# Patient Record
Sex: MALE | Race: WHITE | Employment: OTHER | URBAN - METROPOLITAN AREA
[De-identification: names, ages, dates, MRNs, and addresses within clinical notes are randomized per-mention and may not be internally consistent; named-entity substitution may affect disease eponyms.]

---

## 2017-07-26 ENCOUNTER — HOSPITAL ENCOUNTER (OUTPATIENT)
Dept: GENERAL RADIOLOGY | Age: 62
Discharge: HOME | End: 2017-07-26
Attending: FAMILY MEDICINE
Payer: COMMERCIAL

## 2017-07-26 DIAGNOSIS — M79.672 LEFT FOOT PAIN: ICD-10-CM

## 2017-07-26 PROCEDURE — 73630 X-RAY EXAM OF FOOT: CPT

## 2020-02-13 ENCOUNTER — HOSPITAL ENCOUNTER (OUTPATIENT)
Dept: VASCULAR SURGERY | Age: 65
Discharge: HOME | End: 2020-02-13
Attending: FAMILY MEDICINE
Payer: COMMERCIAL

## 2020-02-13 DIAGNOSIS — I65.23 BILATERAL CAROTID ARTERY OCCLUSION: ICD-10-CM

## 2020-02-13 PROCEDURE — 93880 EXTRACRANIAL BILAT STUDY: CPT

## 2020-02-14 LAB
LEFT CCA DIST DIAS: 22.8 CM/S
LEFT CCA DIST SYS: 78.4 CM/S
LEFT CCA PROX DIAS: 21.5 CM/S
LEFT CCA PROX SYS: 99.1 CM/S
LEFT ECA DIAS: 18.89 CM/S
LEFT ECA SYS: 106.9 CM/S
LEFT ICA DIST DIAS: 20.7 CM/S
LEFT ICA DIST SYS: 45.9 CM/S
LEFT ICA MID DIAS: 20.6 CM/S
LEFT ICA MID SYS: 45.9 CM/S
LEFT ICA PROX DIAS: 12.7 CM/S
LEFT ICA PROX SYS: 45 CM/S
LEFT ICA/CCA SYS: 0.59
LEFT SUBCLAVIAN DIAS: 0 CM/S
LEFT SUBCLAVIAN SYS: 95.6 CM/S
LEFT VERTEBRAL DIAS: 18.93 CM/S
LEFT VERTEBRAL SYS: 53.1 CM/S
RIGHT CCA DIST DIAS: 15 CM/S
RIGHT CCA DIST SYS: 78.4 CM/S
RIGHT CCA PROX DIAS: 15 CM/S
RIGHT CCA PROX SYS: 83.6 CM/S
RIGHT ECA DIAS: 11.13 CM/S
RIGHT ECA SYS: 97.8 CM/S
RIGHT ICA DIST DIAS: 37 CM/S
RIGHT ICA DIST SYS: 92.7 CM/S
RIGHT ICA MID DIAS: 22.8 CM/S
RIGHT ICA MID SYS: 83.6 CM/S
RIGHT ICA PROX DIAS: 16.3 CM/S
RIGHT ICA PROX SYS: 51.3 CM/S
RIGHT ICA/CCA SYS: 1.2
RIGHT SUBCLAVIAN DIAS: 0 CM/S
RIGHT SUBCLAVIAN SYS: 120.6 CM/S
RIGHT VERTEBRAL DIAS: 12.53 CM/S
RIGHT VERTEBRAL SYS: 51 CM/S

## 2022-01-21 ENCOUNTER — TRANSCRIBE ORDER (OUTPATIENT)
Dept: SCHEDULING | Age: 67
End: 2022-01-21

## 2022-01-21 DIAGNOSIS — Z13.6 ENCOUNTER FOR SCREENING FOR CARDIOVASCULAR DISORDERS: Primary | ICD-10-CM

## 2022-02-03 ENCOUNTER — HOSPITAL ENCOUNTER (OUTPATIENT)
Dept: VASCULAR SURGERY | Age: 67
Discharge: HOME | End: 2022-02-03
Attending: FAMILY MEDICINE
Payer: MEDICARE

## 2022-02-03 ENCOUNTER — HOSPITAL ENCOUNTER (OUTPATIENT)
Dept: ULTRASOUND IMAGING | Age: 67
Discharge: HOME | End: 2022-02-03
Attending: FAMILY MEDICINE
Payer: MEDICARE

## 2022-02-03 DIAGNOSIS — Z13.6 ENCOUNTER FOR SCREENING FOR CARDIOVASCULAR DISORDERS: ICD-10-CM

## 2022-02-03 PROCEDURE — 93880 EXTRACRANIAL BILAT STUDY: CPT

## 2022-02-03 PROCEDURE — 76706 US ABDL AORTA SCREEN AAA: CPT

## 2022-02-04 LAB
LEFT CCA DIST DIAS: 20.2 CM/S
LEFT CCA DIST SYS: 83.6 CM/S
LEFT CCA PROX DIAS: 21.5 CM/S
LEFT CCA PROX SYS: 127.9 CM/S
LEFT ECA DIAS: 0 CM/S
LEFT ECA SYS: 91.4 CM/S
LEFT ICA DIST DIAS: 25 CM/S
LEFT ICA DIST SYS: 72.1 CM/S
LEFT ICA MID DIAS: 25.8 CM/S
LEFT ICA MID SYS: 62.5 CM/S
LEFT ICA PROX DIAS: 10.2 CM/S
LEFT ICA PROX SYS: 46.9 CM/S
LEFT ICA/CCA SYS: 0.86
LEFT VERTEBRAL DIAS: 17.1 CM/S
LEFT VERTEBRAL SYS: 52 CM/S
RIGHT CCA DIST DIAS: 16.3 CM/S
RIGHT CCA DIST SYS: 83.6 CM/S
RIGHT CCA PROX DIAS: 20.2 CM/S
RIGHT CCA PROX SYS: 110.8 CM/S
RIGHT ECA DIAS: 13.89 CM/S
RIGHT ECA SYS: 104.3 CM/S
RIGHT ICA DIST DIAS: 25.4 CM/S
RIGHT ICA DIST SYS: 91.4 CM/S
RIGHT ICA MID DIAS: 28 CM/S
RIGHT ICA MID SYS: 86.2 CM/S
RIGHT ICA PROX DIAS: 16.2 CM/S
RIGHT ICA PROX SYS: 71.2 CM/S
RIGHT ICA/CCA SYS: 1.1
RIGHT VERTEBRAL DIAS: 11.82 CM/S
RIGHT VERTEBRAL SYS: 46 CM/S
VAS LEFT SUBCLAVIAN PROX EDV: 11.6 CM/S
VAS LEFT SUBCLAVIAN PROX PSV: 147.7 CM/S
VAS RIGHT SUBCLAVIAN PROX EDV: 0 CM/S
VAS RIGHT SUBCLAVIAN PROX PSV: 209.8 CM/S

## 2022-06-02 ENCOUNTER — HOSPITAL ENCOUNTER (OUTPATIENT)
Dept: GENERAL RADIOLOGY | Age: 67
Discharge: HOME | End: 2022-06-02
Payer: MEDICARE

## 2022-06-02 ENCOUNTER — TRANSCRIBE ORDER (OUTPATIENT)
Dept: EMERGENCY DEPT | Age: 67
End: 2022-06-02

## 2022-06-02 DIAGNOSIS — M54.50 LOW BACK PAIN: ICD-10-CM

## 2022-06-02 DIAGNOSIS — M54.50 LOW BACK PAIN: Primary | ICD-10-CM

## 2022-06-02 PROCEDURE — 72100 X-RAY EXAM L-S SPINE 2/3 VWS: CPT

## 2022-08-22 ENCOUNTER — OFFICE VISIT (OUTPATIENT)
Dept: ORTHOPEDIC SURGERY | Age: 67
End: 2022-08-22
Payer: MEDICARE

## 2022-08-22 VITALS — BODY MASS INDEX: 23.7 KG/M2 | WEIGHT: 160 LBS | HEIGHT: 69 IN

## 2022-08-22 DIAGNOSIS — G89.29 OTHER CHRONIC BACK PAIN: Primary | ICD-10-CM

## 2022-08-22 DIAGNOSIS — M54.9 OTHER CHRONIC BACK PAIN: Primary | ICD-10-CM

## 2022-08-22 PROCEDURE — 3017F COLORECTAL CA SCREEN DOC REV: CPT | Performed by: ORTHOPAEDIC SURGERY

## 2022-08-22 PROCEDURE — G8536 NO DOC ELDER MAL SCRN: HCPCS | Performed by: ORTHOPAEDIC SURGERY

## 2022-08-22 PROCEDURE — G8420 CALC BMI NORM PARAMETERS: HCPCS | Performed by: ORTHOPAEDIC SURGERY

## 2022-08-22 PROCEDURE — 1123F ACP DISCUSS/DSCN MKR DOCD: CPT | Performed by: ORTHOPAEDIC SURGERY

## 2022-08-22 PROCEDURE — 99204 OFFICE O/P NEW MOD 45 MIN: CPT | Performed by: ORTHOPAEDIC SURGERY

## 2022-08-22 PROCEDURE — G8427 DOCREV CUR MEDS BY ELIG CLIN: HCPCS | Performed by: ORTHOPAEDIC SURGERY

## 2022-08-22 PROCEDURE — G8432 DEP SCR NOT DOC, RNG: HCPCS | Performed by: ORTHOPAEDIC SURGERY

## 2022-08-22 PROCEDURE — 1101F PT FALLS ASSESS-DOCD LE1/YR: CPT | Performed by: ORTHOPAEDIC SURGERY

## 2022-08-22 RX ORDER — CHORIONIC GONADOTROPIN 10000 UNIT
KIT INTRAMUSCULAR
COMMUNITY

## 2022-08-22 RX ORDER — METHYLPHENIDATE HYDROCHLORIDE 27 MG/1
27 TABLET ORAL DAILY
COMMUNITY
Start: 2022-07-27

## 2022-08-22 NOTE — PROGRESS NOTES
Javier Triana (: 1955) is a 73 y.o. male patient, here for evaluation of the following chief complaint(s):  Back Pain       ASSESSMENT/PLAN:  Below is the assessment and plan developed based on review of pertinent history, physical exam, labs, studies, and medications. Lower back pain 3 months of physical therapy some improvement a lot of mechanical discomfort no radiculopathy I like to move forward with an MRI I suspect he has facet osteoarthritis with neuroforamen impingement and he may benefit from a localized injection based on MRI findings on the lumbar spine  Mri reviewed. Symptoms and lesion concerniing for chordoma. Discussed with pt. Will get mri with contrast.  May get second opiinion. 1. Other chronic back pain  -     XR SPINE ENTIRE T-L , SKULL TO SACRUM 1 VW SCOLIOSIS; Future  -     XR SPINE LUMBAR BEND/FLEXION EXTENSION; Future  -     MRI LUMB SPINE WO CONT; Future      No follow-ups on file. SUBJECTIVE/OBJECTIVE:  Javier Triana (: 1955) is a 73 y.o. male who presents today for the following:  Chief Complaint   Patient presents with    Back Pain       Lower back pain for a good 9 months he has tried home exercises hot yoga as well as recently 3 months of formal physical therapy tries to stay active no trauma no falls no loss of bowel or bladder function no true radiculopathy most of his discomfort is mechanical    IMAGING:  AP and lateral view of his lumbar spine he has osteoarthritis at 4 5 S1 I do not appreciate spondylolisthesis with flexion-extension views he has some degenerative changes at L5-S1 at the disc and L4-5 at the disc he has some mild lumbar asymmetry pedicles are well visualized    No Known Allergies    Current Outpatient Medications   Medication Sig    chorionic gonadotropin, human 10,000 unit solr chorionic gonadotropin, human 10,000 unit intramuscular solution   Inject 10 units every day by intramuscular route.     methylphenidate ER 27 mg 24 hr tab Take 27 mg by mouth daily. rosuvastatin (CRESTOR) 20 mg tablet Take 20 mg by mouth nightly. DULoxetine (CYMBALTA) 60 mg capsule Take 60 mg by mouth daily. buPROPion SR (WELLBUTRIN SR) 150 mg SR tablet Take  by mouth two (2) times a day. lansoprazole (PREVACID) 15 mg capsule Take 15 mg by mouth Daily (before breakfast). (Patient not taking: Reported on 8/22/2022)     No current facility-administered medications for this visit. History reviewed. No pertinent past medical history. Past Surgical History:   Procedure Laterality Date    UT CARDIAC SURG PROCEDURE UNLIST  heart cath       Family History   Problem Relation Age of Onset    Cancer Mother     Stroke Father         Social History     Tobacco Use    Smoking status: Former     Types: Cigarettes    Smokeless tobacco: Never   Substance Use Topics    Alcohol use: No        Review of Systems     No flowsheet data found. Vitals:  Ht 5' 9\" (1.753 m)   Wt 160 lb (72.6 kg)   BMI 23.63 kg/m²    Body mass index is 23.63 kg/m². Physical Exam    Pleasant young man well-groomed can walk on his heels walk on his toes moderate discomfort with flexion extension of the lumbar spine he does have a stork sign the patient can walk on heels and toes. Negative Romberg. Negative drift. Extraocular motility is intact. No pain with axial compression of the shoulder or head. No pain to palpation, spinous processes, cervical or thoracic or lumbar spine. No pain with flexion or extension of the lumbar spine. Hamstrings are not tight. No dimples. No hairy patches. No pelvic obliquity. No limb length discrepancy. No clonus. Negative straight leg raise, no prominence on Reynoso forward bending test.  +2 reflexes throughout. 5/5 muscle strength. Painless internal and external rotation of the hips. Abdomen is soft, nontender. No masses are appreciated. No kyphosis present. Sensation is intact to light touch.       An electronic signature was used to authenticate this note.   -- Mary Manning MD

## 2022-08-31 DIAGNOSIS — G89.29 OTHER CHRONIC BACK PAIN: Primary | ICD-10-CM

## 2022-08-31 DIAGNOSIS — M54.9 OTHER CHRONIC BACK PAIN: Primary | ICD-10-CM

## 2022-09-01 DIAGNOSIS — G89.29 OTHER CHRONIC BACK PAIN: Primary | ICD-10-CM

## 2022-09-01 DIAGNOSIS — M54.9 OTHER CHRONIC BACK PAIN: Primary | ICD-10-CM

## 2022-09-14 ENCOUNTER — HOSPITAL ENCOUNTER (OUTPATIENT)
Dept: MRI IMAGING | Age: 67
Discharge: HOME | End: 2022-09-14
Attending: ORTHOPAEDIC SURGERY
Payer: MEDICARE

## 2022-09-14 VITALS — BODY MASS INDEX: 24.29 KG/M2 | WEIGHT: 164 LBS | HEIGHT: 69 IN

## 2022-09-14 DIAGNOSIS — G89.29 OTHER CHRONIC BACK PAIN: ICD-10-CM

## 2022-09-14 DIAGNOSIS — M54.9 OTHER CHRONIC BACK PAIN: ICD-10-CM

## 2022-09-14 PROCEDURE — A9576 INJ PROHANCE MULTIPACK: HCPCS | Performed by: RADIOLOGY

## 2022-09-14 PROCEDURE — 72197 MRI PELVIS W/O & W/DYE: CPT

## 2022-09-14 PROCEDURE — 74011250636 HC RX REV CODE- 250/636: Performed by: RADIOLOGY

## 2022-09-14 RX ADMIN — GADOTERIDOL 14 ML: 279.3 INJECTION, SOLUTION INTRAVENOUS at 08:24

## 2022-10-27 ENCOUNTER — TELEPHONE (OUTPATIENT)
Dept: ORTHOPEDIC SURGERY | Age: 67
End: 2022-10-27

## 2022-10-27 DIAGNOSIS — M51.9 LUMBAR DISC DISEASE: ICD-10-CM

## 2022-10-27 NOTE — TELEPHONE ENCOUNTER
Orders for SAVAGE  L4-L5 sent  to [address] [place] [address]      ----- Message from Candy Sanchez MD sent at 9/30/2022  6:33 AM EDT -----  Regarding: FW: Injection  Epidural steroid injection. Does not matter which level. thanks  ----- Message -----  From: Faith Vernon LPN  Sent: 7/83/8044   6:03 PM EDT  To: Candy Sanchez MD  Subject: Injection                                        Patient called requesting injection? ?? Let me know what level.   TY

## 2022-11-16 ENCOUNTER — HOSPITAL ENCOUNTER (OUTPATIENT)
Dept: INTERVENTIONAL RADIOLOGY/VASCULAR | Age: 67
Discharge: HOME | End: 2022-11-16
Attending: ORTHOPAEDIC SURGERY | Admitting: RADIOLOGY
Payer: MEDICARE

## 2022-11-16 VITALS — WEIGHT: 167.8 LBS | HEIGHT: 69 IN | BODY MASS INDEX: 24.85 KG/M2

## 2022-11-16 DIAGNOSIS — M51.9 LUMBAR DISC DISEASE: ICD-10-CM

## 2022-11-16 PROCEDURE — 62323 NJX INTERLAMINAR LMBR/SAC: CPT

## 2022-11-16 PROCEDURE — 74011000250 HC RX REV CODE- 250: Performed by: RADIOLOGY

## 2022-11-16 PROCEDURE — 74011250636 HC RX REV CODE- 250/636: Performed by: RADIOLOGY

## 2022-11-16 PROCEDURE — 74011000636 HC RX REV CODE- 636: Performed by: RADIOLOGY

## 2022-11-16 PROCEDURE — 64483 NJX AA&/STRD TFRM EPI L/S 1: CPT

## 2022-11-16 RX ORDER — BUPIVACAINE HYDROCHLORIDE 2.5 MG/ML
6 INJECTION, SOLUTION EPIDURAL; INFILTRATION; INTRACAUDAL ONCE
Status: COMPLETED | OUTPATIENT
Start: 2022-11-16 | End: 2022-11-16

## 2022-11-16 RX ORDER — LIDOCAINE HYDROCHLORIDE 10 MG/ML
20 INJECTION, SOLUTION EPIDURAL; INFILTRATION; INTRACAUDAL; PERINEURAL ONCE
Status: DISCONTINUED | OUTPATIENT
Start: 2022-11-16 | End: 2022-11-16 | Stop reason: HOSPADM

## 2022-11-16 RX ORDER — DEXAMETHASONE SODIUM PHOSPHATE 10 MG/ML
10 INJECTION INTRAMUSCULAR; INTRAVENOUS ONCE
Status: COMPLETED | OUTPATIENT
Start: 2022-11-16 | End: 2022-11-16

## 2022-11-16 RX ORDER — LIDOCAINE HYDROCHLORIDE 10 MG/ML
6 INJECTION, SOLUTION EPIDURAL; INFILTRATION; INTRACAUDAL; PERINEURAL ONCE
Status: COMPLETED | OUTPATIENT
Start: 2022-11-16 | End: 2022-11-16

## 2022-11-16 RX ADMIN — BUPIVACAINE HYDROCHLORIDE 15 MG: 2.5 INJECTION, SOLUTION EPIDURAL; INFILTRATION; INTRACAUDAL; PERINEURAL at 10:30

## 2022-11-16 RX ADMIN — LIDOCAINE HYDROCHLORIDE 6 ML: 10 INJECTION, SOLUTION EPIDURAL; INFILTRATION; INTRACAUDAL; PERINEURAL at 11:00

## 2022-11-16 RX ADMIN — IOPAMIDOL 3 ML: 408 INJECTION, SOLUTION INTRATHECAL at 10:32

## 2022-11-16 RX ADMIN — DEXAMETHASONE SODIUM PHOSPHATE 10 MG: 10 INJECTION, SOLUTION INTRAMUSCULAR; INTRAVENOUS at 10:32

## 2022-11-16 NOTE — DISCHARGE INSTRUCTIONS
Lumbar Epidural Steroid Injection: What to Expect at [address] [place] [address]  During your lumbar epidural injection, your doctor injected steroid medicine into the area around your spinal cord to help with pain, tingling, or numbness. Steroids don't always work. And when they do, it takes a few days. But the pain relief can last for several days to a few months or longer. Your injection may also have included a numbing medicine that works right away for a short time. It may leave your legs feeling heavy or numb at first. You will probably be able to walk. But you may need to be extra careful. The effects of the numbing medicine should wear off in a few hours. This care sheet gives you a general idea about how long it will take for you to recover. But each person recovers at a different pace. Follow the steps below to feel better as quickly as possible. How can you care for yourself at home? Activity    You may want to do less than normal for a few days. But you may also be able to return to your daily routine. You may shower if your doctor okays it. Do not take a bath for the first 24 hours, or until your doctor tells you it is okay. Diet    You can eat your normal diet. Medicines    Your doctor will tell you if and when you can restart your medicines. You will also get instructions about taking any new medicines. If you stopped taking aspirin or some other blood thinner, your doctor will tell you when to start taking it again. Be safe with medicines. Read and follow all instructions on the label. If the doctor gave you a prescription medicine for pain, take it as prescribed. If you are not taking a prescription pain medicine, ask your doctor if you can take an over-the-counter medicine. Ice    If the site of your injection feels sore or tender, put ice or a cold pack on it for 10 to 20 minutes at a time. Put a thin cloth between the ice and your skin.    Follow-up care is a key part of your treatment and safety. Be sure to make and go to all appointments, and call your doctor if you are having problems. It's also a good idea to know your test results and keep a list of the medicines you take. When should you call for help? Call 911 anytime you think you may need emergency care. For example, call if:    You passed out (lost consciousness). You have trouble breathing. Call your doctor now or seek immediate medical care if:    You have new pain, or your pain gets worse. You have new numbness in your buttocks or legs. You have new weakness in your buttocks or legs. You have a severe headache. You have new loss of bowel or bladder control. You have signs of infection, such as: Increased pain, swelling, warmth, or redness. A fever. Watch closely for any changes in your health, and be sure to contact your doctor if:    You do not get better as expected. Current as of: February 23, 2022               Content Version: 13.4  © 2006-2022 [employer]. Care instructions adapted under license by [employer] (which disclaims liability or warranty for this information). If you have questions about a medical condition or this instruction, always ask your healthcare professional. [place] [address] any warranty or liability for your use of this information.

## 2022-11-16 NOTE — ROUTINE PROCESS
9:04 AM  Patient arrived. ID and allergies verified verbally with patient. Pt voices understanding of procedure to be performed. Consent obtained. Pt prepped for procedure. 10:10 AM  TRANSFER - OUT REPORT:    Verbal report given to Elissa RN(name) on [facility]  being transferred to IR(unit) for ordered procedure       Report consisted of patients Situation, Background, Assessment and   Recommendations(SBAR). Information from the following report(s) SBAR was reviewed with the receiving nurse. Lines:       Opportunity for questions and clarification was provided. Patient transported with:   Registered Nurse    10:45 AM  TRANSFER - IN REPORT:    Verbal report received from Ed RN(name) on [facility]  being received from IR(unit) for routine post - op      Report consisted of patients Situation, Background, Assessment and   Recommendations(SBAR). Information from the following report(s) SBAR and Procedure Summary was reviewed with the receiving nurse. Opportunity for questions and clarification was provided. Assessment completed upon patients arrival to unit and care assumed. 10:50 AM  Patient laying in stretcher, band aids on back clean dry and intact. 10:55 AM  Patient sitting on side of bed, reports no feeling of numbness. Discharge instructions reviewed with patient and family. Voiced understanding. Patient given copy of discharge instructions to take home. 11:00 AM  Patient stands at bedside, reports no feeling of numbness, ambulates with no issues. 11:10 AM  Pt discharged via wheelchair with wife. Personal belongings with patient upon discharge.

## 2022-11-16 NOTE — PROGRESS NOTES
TRANSFER - IN REPORT:    Verbal report received from Suzette(name) on [facility]  [facility]      Report consisted of patients Situation, Background, Assessment and   Recommendations(SBAR). Information from the following report(s) SBAR was reviewed with the receiving nurse. Opportunity for questions and clarification was provided. Assessment completed upon patients arrival to unit and care assumed.

## 2023-01-12 ENCOUNTER — TELEPHONE (OUTPATIENT)
Dept: ORTHOPEDIC SURGERY | Age: 68
End: 2023-01-12

## 2023-01-12 DIAGNOSIS — M51.9 LUMBAR DISC DISEASE: ICD-10-CM

## 2023-01-12 DIAGNOSIS — G89.29 OTHER CHRONIC BACK PAIN: Primary | ICD-10-CM

## 2023-01-12 DIAGNOSIS — M54.9 OTHER CHRONIC BACK PAIN: Primary | ICD-10-CM

## 2023-01-19 ENCOUNTER — HOSPITAL ENCOUNTER (OUTPATIENT)
Dept: INTERVENTIONAL RADIOLOGY/VASCULAR | Age: 68
Discharge: HOME | End: 2023-01-19
Attending: ORTHOPAEDIC SURGERY | Admitting: STUDENT IN AN ORGANIZED HEALTH CARE EDUCATION/TRAINING PROGRAM

## 2023-01-19 VITALS — WEIGHT: 178.9 LBS | HEIGHT: 69 IN | BODY MASS INDEX: 26.5 KG/M2

## 2023-01-19 DIAGNOSIS — M51.9 LUMBAR DISC DISEASE: ICD-10-CM

## 2023-01-19 DIAGNOSIS — G89.29 OTHER CHRONIC BACK PAIN: ICD-10-CM

## 2023-01-19 DIAGNOSIS — M54.9 OTHER CHRONIC BACK PAIN: ICD-10-CM

## 2023-01-19 RX ORDER — DEXAMETHASONE SODIUM PHOSPHATE 10 MG/ML
10 INJECTION INTRAMUSCULAR; INTRAVENOUS ONCE
Status: DISCONTINUED | OUTPATIENT
Start: 2023-01-19 | End: 2023-01-19 | Stop reason: HOSPADM

## 2023-01-19 RX ORDER — LIDOCAINE HYDROCHLORIDE 10 MG/ML
8 INJECTION, SOLUTION EPIDURAL; INFILTRATION; INTRACAUDAL; PERINEURAL
Status: DISCONTINUED | OUTPATIENT
Start: 2023-01-19 | End: 2023-01-19 | Stop reason: HOSPADM

## 2023-01-19 NOTE — ROUTINE PROCESS
7:47 AM    Patient arrived. ID and allergies verified verbally with patient. Pt voices understanding of procedure to be performed. Consent obtained. Pt prepped for procedure. Pt denies contrast allergy. Patient denies taking any blood thinners.

## 2023-01-20 ENCOUNTER — HOSPITAL ENCOUNTER (OUTPATIENT)
Dept: INTERVENTIONAL RADIOLOGY/VASCULAR | Age: 68
Discharge: HOME | End: 2023-01-20
Attending: ORTHOPAEDIC SURGERY | Admitting: STUDENT IN AN ORGANIZED HEALTH CARE EDUCATION/TRAINING PROGRAM
Payer: MEDICARE

## 2023-01-20 VITALS — WEIGHT: 177.4 LBS | BODY MASS INDEX: 26.28 KG/M2 | HEIGHT: 69 IN

## 2023-01-20 PROCEDURE — 74011000250 HC RX REV CODE- 250: Performed by: STUDENT IN AN ORGANIZED HEALTH CARE EDUCATION/TRAINING PROGRAM

## 2023-01-20 PROCEDURE — 2709999900 HC NON-CHARGEABLE SUPPLY

## 2023-01-20 PROCEDURE — 74011000636 HC RX REV CODE- 636: Performed by: STUDENT IN AN ORGANIZED HEALTH CARE EDUCATION/TRAINING PROGRAM

## 2023-01-20 PROCEDURE — 74011250636 HC RX REV CODE- 250/636: Performed by: STUDENT IN AN ORGANIZED HEALTH CARE EDUCATION/TRAINING PROGRAM

## 2023-01-20 PROCEDURE — 64483 NJX AA&/STRD TFRM EPI L/S 1: CPT

## 2023-01-20 PROCEDURE — 77030003666 HC NDL SPINAL BD -A

## 2023-01-20 RX ORDER — DEXAMETHASONE SODIUM PHOSPHATE 10 MG/ML
10 INJECTION INTRAMUSCULAR; INTRAVENOUS
Status: DISCONTINUED | OUTPATIENT
Start: 2023-01-20 | End: 2023-01-20 | Stop reason: HOSPADM

## 2023-01-20 RX ORDER — LIDOCAINE HYDROCHLORIDE 10 MG/ML
5 INJECTION, SOLUTION EPIDURAL; INFILTRATION; INTRACAUDAL; PERINEURAL
Status: DISCONTINUED | OUTPATIENT
Start: 2023-01-20 | End: 2023-01-20 | Stop reason: HOSPADM

## 2023-01-20 RX ADMIN — IOPAMIDOL 2 ML: 408 INJECTION, SOLUTION INTRATHECAL at 09:08

## 2023-01-20 RX ADMIN — LIDOCAINE HYDROCHLORIDE 12 ML: 10 INJECTION, SOLUTION EPIDURAL; INFILTRATION; INTRACAUDAL; PERINEURAL at 09:05

## 2023-01-20 RX ADMIN — DEXAMETHASONE SODIUM PHOSPHATE 15 MG: 10 INJECTION, SOLUTION INTRAMUSCULAR; INTRAVENOUS at 09:13

## 2023-01-20 NOTE — ROUTINE PROCESS
Patient arrived. ID and allergies verified verbally with patient. Pt voices understanding of procedure to be performed. Consent obtained. Pt prepped for procedure. Pt denies contrast allergy. Patient denies taking any blood thinners. 3074: TRANSFER - OUT REPORT:    Verbal report given to Ed RN(name) on [facility]  being transferred to angio lab(unit) for ordered procedure       Report consisted of patients Situation, Background, Assessment and   Recommendations(SBAR). Information from the following report(s) SBAR was reviewed with the receiving nurse. Lines:       Opportunity for questions and clarification was provided. Patient transported with:   Registered Nurse    5609: TRANSFER - IN REPORT:    Verbal report received from [facility] (name) on [facility]  being received from angio lab(unit) for routine post - op      Report consisted of patients Situation, Background, Assessment and   Recommendations(SBAR). Information from the following report(s) Procedure Summary was reviewed with the receiving nurse. Opportunity for questions and clarification was provided. Assessment completed upon patients arrival to unit and care assumed. 0920: Patient received from angio lab. Patient with bandaids to bilateral lower back. Sites clean, dry and intact. No hematoma. Patient with no complaints at this time. 0930: Patient dangled on the side of the bed. Site CDI. No complaints. 0930: Discharge instructions and prescriptions reviewed with patient. Opportunity provided for questions. Patient verbalized understanding. Signed copy of discharge placed in the front of patient's chart. 0935: Patient ambulated in the hallway. Gait steady. No complaints    0950: Patient escorted via wheelchair to entrance. Patient wife driving. Patient discharged into care of wife.

## 2023-01-20 NOTE — PROGRESS NOTES
TRANSFER - OUT REPORT:    Verbal report given to kin(elvi) on [facility]       Report consisted of patient's Situation, Background, Assessment and   Recommendations(SBAR). Information from the following report(s) SBAR was reviewed with the receiving nurse. Opportunity for questions and clarification was provided.       Patient transported with:   Registered Nurse

## 2023-01-23 NOTE — H&P
Interventional and Vascular Radiology History and Physical    Patient: Dixon Bennett 78 y.o. male       Chief Complaint: back pain/radiculopathy     History of Present Illness: fluoroscopic guidance for bilateral L3-L4 transforaminal epidural injection of steroid    History:    No past medical history on file. Family History   Problem Relation Age of Onset    Cancer Mother     Stroke Father      Social History     Socioeconomic History    Marital status:      Spouse name: Not on file    Number of children: Not on file    Years of education: Not on file    Highest education level: Not on file   Occupational History    Not on file   Tobacco Use    Smoking status: Former     Types: Cigarettes    Smokeless tobacco: Never   Substance and Sexual Activity    Alcohol use: No    Drug use: No    Sexual activity: Not on file   Other Topics Concern    Not on file   Social History Narrative    Not on file     Social Determinants of Health     Financial Resource Strain: Not on file   Food Insecurity: Not on file   Transportation Needs: Not on file   Physical Activity: Not on file   Stress: Not on file   Social Connections: Not on file   Intimate Partner Violence: Not on file   Housing Stability: Not on file       Allergies: No Known Allergies    Current Medications:  No current facility-administered medications for this encounter. Current Outpatient Medications   Medication Sig    chorionic gonadotropin, human 10,000 unit solr chorionic gonadotropin, human 10,000 unit intramuscular solution   Inject 10 units every day by intramuscular route. methylphenidate ER 27 mg 24 hr tab Take 27 mg by mouth daily. rosuvastatin (CRESTOR) 20 mg tablet Take 20 mg by mouth nightly. DULoxetine (CYMBALTA) 60 mg capsule Take 60 mg by mouth daily. buPROPion SR (WELLBUTRIN SR) 150 mg SR tablet Take  by mouth two (2) times a day. lansoprazole (PREVACID) 15 mg capsule Take 15 mg by mouth Daily (before breakfast).  (Patient not taking: No sig reported)        Physical Exam:  Height 5' 9\" (1.753 m), weight 80.5 kg (177 lb 6.4 oz). No acute distress  Good peripheral perfusion  Nonlabored respirations  Abdomen nondistended    Alerts:    Hospital Problems  Date Reviewed: 8/22/2022   None      Laboratory:    No results for input(s): HGB, HCT, WBC, PLT, INR, BUN, CREA, K, CRCLT, HGBEXT, HCTEXT, PLTEXT, INREXT in the last 72 hours. No lab exists for component: PTT, PT      Plan of Care/Planned Procedure:  Risks, benefits, and alternatives discussed with the appropriate decision-maker, who agreed to proceed.      Samra Mercer MD

## 2023-07-12 ENCOUNTER — TRANSCRIBE ORDERS (OUTPATIENT)
Facility: HOSPITAL | Age: 68
End: 2023-07-12

## 2023-07-12 DIAGNOSIS — E78.00 PURE HYPERCHOLESTEROLEMIA: Primary | ICD-10-CM

## 2023-07-12 DIAGNOSIS — I10 ESSENTIAL HYPERTENSION: ICD-10-CM

## 2023-07-20 ENCOUNTER — HOSPITAL ENCOUNTER (OUTPATIENT)
Facility: HOSPITAL | Age: 68
Discharge: HOME | End: 2023-07-20

## 2023-07-20 DIAGNOSIS — I10 ESSENTIAL HYPERTENSION: ICD-10-CM

## 2023-07-20 DIAGNOSIS — E78.00 PURE HYPERCHOLESTEROLEMIA: ICD-10-CM

## 2023-07-20 PROCEDURE — 75571 CT HRT W/O DYE W/CA TEST: CPT

## 2023-12-07 ENCOUNTER — ANESTHESIA (OUTPATIENT)
Facility: HOSPITAL | Age: 68
End: 2023-12-07
Payer: MEDICARE

## 2023-12-07 ENCOUNTER — ANESTHESIA EVENT (OUTPATIENT)
Facility: HOSPITAL | Age: 68
End: 2023-12-07
Payer: MEDICARE

## 2023-12-07 ENCOUNTER — HOSPITAL ENCOUNTER (OUTPATIENT)
Facility: HOSPITAL | Age: 68
Discharge: HOME | End: 2023-12-09
Attending: INTERNAL MEDICINE
Payer: MEDICARE

## 2023-12-07 VITALS
SYSTOLIC BLOOD PRESSURE: 110 MMHG | HEART RATE: 78 BPM | OXYGEN SATURATION: 97 % | BODY MASS INDEX: 25.93 KG/M2 | DIASTOLIC BLOOD PRESSURE: 71 MMHG | RESPIRATION RATE: 16 BRPM | HEIGHT: 69 IN | WEIGHT: 175.04 LBS | TEMPERATURE: 98 F

## 2023-12-07 DIAGNOSIS — Q21.10 ATRIAL SEPTAL DEFECT: ICD-10-CM

## 2023-12-07 LAB
ECHO AO ASC DIAM: 3.6 CM
ECHO AO ASCENDING AORTA INDEX: 1.85 CM/M2
ECHO AO ROOT DIAM: 3.8 CM
ECHO AO ROOT INDEX: 1.95 CM/M2
ECHO BSA: 1.97 M2
ECHO TV REGURGITANT MAX VELOCITY: 2.34 M/S
ECHO TV REGURGITANT PEAK GRADIENT: 22 MMHG

## 2023-12-07 PROCEDURE — 93325 DOPPLER ECHO COLOR FLOW MAPG: CPT

## 2023-12-07 PROCEDURE — 3700000000 HC ANESTHESIA ATTENDED CARE

## 2023-12-07 PROCEDURE — 6360000002 HC RX W HCPCS: Performed by: NURSE ANESTHETIST, CERTIFIED REGISTERED

## 2023-12-07 PROCEDURE — 2500000003 HC RX 250 WO HCPCS: Performed by: NURSE ANESTHETIST, CERTIFIED REGISTERED

## 2023-12-07 PROCEDURE — 3700000001 HC ADD 15 MINUTES (ANESTHESIA)

## 2023-12-07 PROCEDURE — 2580000003 HC RX 258: Performed by: NURSE ANESTHETIST, CERTIFIED REGISTERED

## 2023-12-07 RX ORDER — ATOMOXETINE 40 MG/1
80 CAPSULE ORAL DAILY
COMMUNITY

## 2023-12-07 RX ORDER — ASCORBIC ACID
CRYSTALS ORAL
COMMUNITY

## 2023-12-07 RX ORDER — TRAZODONE HYDROCHLORIDE 50 MG/1
50 TABLET ORAL NIGHTLY
COMMUNITY

## 2023-12-07 RX ORDER — TADALAFIL 5 MG/1
5 TABLET ORAL PRN
COMMUNITY

## 2023-12-07 RX ORDER — AMLODIPINE BESYLATE 10 MG/1
10 TABLET ORAL DAILY
COMMUNITY

## 2023-12-07 RX ORDER — LIDOCAINE HYDROCHLORIDE 20 MG/ML
INJECTION, SOLUTION EPIDURAL; INFILTRATION; INTRACAUDAL; PERINEURAL
Status: COMPLETED
Start: 2023-12-07 | End: 2023-12-07

## 2023-12-07 RX ORDER — SODIUM CHLORIDE 9 MG/ML
INJECTION, SOLUTION INTRAVENOUS CONTINUOUS PRN
Status: DISCONTINUED | OUTPATIENT
Start: 2023-12-07 | End: 2023-12-07 | Stop reason: SDUPTHER

## 2023-12-07 RX ORDER — ASCORBIC ACID 125 MG
TABLET,CHEWABLE ORAL
COMMUNITY

## 2023-12-07 RX ORDER — ACETAMINOPHEN 160 MG
TABLET,DISINTEGRATING ORAL
COMMUNITY

## 2023-12-07 RX ORDER — LOSARTAN POTASSIUM 100 MG/1
100 TABLET ORAL DAILY
COMMUNITY

## 2023-12-07 RX ORDER — MAGNESIUM GLYCINATE 100 MG
CAPSULE ORAL
COMMUNITY

## 2023-12-07 RX ORDER — PROPOFOL 10 MG/ML
INJECTION, EMULSION INTRAVENOUS
Status: COMPLETED
Start: 2023-12-07 | End: 2023-12-07

## 2023-12-07 RX ORDER — TESTOSTERONE GEL, 1% 10 MG/G
GEL TRANSDERMAL DAILY
COMMUNITY

## 2023-12-07 RX ADMIN — PROPOFOL 50 MG: 10 INJECTION, EMULSION INTRAVENOUS at 09:13

## 2023-12-07 RX ADMIN — LIDOCAINE HYDROCHLORIDE 60 MG: 20 INJECTION, SOLUTION INFILTRATION; PERINEURAL at 08:52

## 2023-12-07 RX ADMIN — PROPOFOL 50 MG: 10 INJECTION, EMULSION INTRAVENOUS at 09:05

## 2023-12-07 RX ADMIN — PROPOFOL 50 MG: 10 INJECTION, EMULSION INTRAVENOUS at 09:09

## 2023-12-07 RX ADMIN — PROPOFOL 150 MG: 10 INJECTION, EMULSION INTRAVENOUS at 08:52

## 2023-12-07 RX ADMIN — PROPOFOL 50 MG: 10 INJECTION, EMULSION INTRAVENOUS at 08:56

## 2023-12-07 RX ADMIN — SODIUM CHLORIDE: 9 INJECTION, SOLUTION INTRAVENOUS at 08:23

## 2023-12-07 RX ADMIN — PROPOFOL 50 MG: 10 INJECTION, EMULSION INTRAVENOUS at 09:02

## 2023-12-07 RX ADMIN — PROPOFOL 50 MG: 10 INJECTION, EMULSION INTRAVENOUS at 08:54

## 2023-12-07 RX ADMIN — PROPOFOL 50 MG: 10 INJECTION, EMULSION INTRAVENOUS at 08:59

## 2023-12-07 NOTE — ANESTHESIA POSTPROCEDURE EVALUATION
Department of Anesthesiology  Postprocedure Note    Patient: Jayde Vega  MRN: [id number]  YOB: 1955  Date of evaluation: 12/7/2023      Procedure Summary     Date: 12/07/23 Room / Location: [facility] NON-INVASIVE CARDIOLOGY    Anesthesia Start: 0829 Anesthesia Stop: 0917    Procedure: ISABELL (PRN CONTRAST/BUBBLE/3D) Diagnosis: Atrial septal defect    Scheduled Providers: Shala Joel MD; Deb Tate MD Responsible Provider: Priscilla Borges MD    Anesthesia Type: MAC ASA Status: 2          Anesthesia Type: MAC    Matthew Phase I: Matthew Score: 10    Matthew Phase II:        Anesthesia Post Evaluation    Patient location during evaluation: PACU  Patient participation: complete - patient participated  Level of consciousness: awake  Airway patency: patent  Nausea & Vomiting: no nausea  Complications: no  Cardiovascular status: blood pressure returned to baseline and hemodynamically stable  Respiratory status: acceptable  Hydration status: stable  Multimodal analgesia pain management approach

## 2023-12-07 NOTE — ANESTHESIA PRE PROCEDURE
Department of Anesthesiology  Preprocedure Note       Name:  Vincenzo Santos   Age:  78 y.o.  :  1955                                          MRN:  [id number]         Date:  2023      Surgeon: * No surgeons listed *    Procedure: * No procedures listed *    Medications prior to admission:   Prior to Admission medications    Medication Sig Start Date End Date Taking? Authorizing Provider   amLODIPine (NORVASC) 10 MG tablet Take 1 tablet by mouth daily   Yes Elaine Brooks MD   losartan (COZAAR) 100 MG tablet Take 1 tablet by mouth daily   Yes Elaine Brooks MD   metFORMIN (GLUCOPHAGE) 1000 MG tablet Take 1 tablet by mouth 2 times daily (with meals)   Yes Elaine Brooks MD   Magnesium Glycinate 100 MG CAPS Take by mouth   Yes Elaine Brooks MD   atomoxetine (STRATTERA) 40 MG capsule Take 2 capsules by mouth daily   Yes Elaine Brooks MD   testosterone (ANDROGEL; TESTIM) 50 MG/5GM (1%) GEL 1% gel Apply topically daily.    Yes Elaine Brooks MD   Cyanocobalamin (VITAMIN B 12) 250 MCG LOZG Take by mouth   Yes Elaine Brooks MD   Cholecalciferol (VITAMIN D3) 50 MCG (2000 UT) CAPS Take by mouth   Yes Elaine Brooks MD   Menaquinone-7 (VITAMIN K2) 100 MCG CAPS Take by mouth   Yes Elaine Brooks MD   Pregnenolone Micronized (PREGNENOLONE PO) Take 100 mg by mouth daily   Yes Elaine Brooks MD   Prasterone, DHEA, (DHEA PO) Take 50 mg by mouth daily   Yes Elaine Brooks MD   tadalafil (CIALIS) 5 MG tablet Take 1 tablet by mouth as needed for Erectile Dysfunction   Yes Elaine Brooks MD   traZODone (DESYREL) 50 MG tablet Take 1 tablet by mouth nightly   Yes ProviderElaine MD   buPROPion (WELLBUTRIN SR) 150 MG extended release tablet Take by mouth 2 times daily   Yes Automatic Reconciliation, Ar   rosuvastatin (CRESTOR) 20 MG tablet Take 1 tablet by mouth   Yes Automatic Reconciliation, Ar       Current medications:    Current

## 2023-12-07 NOTE — PROGRESS NOTES
Patient has returned to baseline at arrival for procedure. Pt awake, alert, and oriented. VSS. Pt denies pain, SOB, and dizziness; only complaint is of GERD symptoms which he says he predominantly has while lying down. I discussed AVS and discharge instructions with patient and with his wife and I provided a copy for him to take home. Pt and wife voiced understanding and denied any questions or need for clarification. IV D/C'd and hemostasis attained. Coban and gauze dressing applied. Transported with:  Марина Zacarias RN via w/c to vehicle driven by his wife.

## 2023-12-07 NOTE — PROGRESS NOTES
Pt arrives ambulatory to noninvasive cardiology department accompanied by his wife for ISABELL procedure. All assessments completed and consent was reviewed. Education given was regarding procedure, medications to be given, potential side effects of medications to be given, post-procedure management and follow-up. Opportunity for questions was provided and all questions and concerns were addressed. Patient and patient contact verbalized understanding of education.

## 2024-05-23 ENCOUNTER — TRANSCRIBE ORDERS (OUTPATIENT)
Dept: SCHEDULING | Age: 69
End: 2024-05-23

## 2024-05-23 DIAGNOSIS — R07.89 OTHER CHEST PAIN: Primary | ICD-10-CM

## 2024-06-03 ENCOUNTER — HOSPITAL ENCOUNTER (OUTPATIENT)
Facility: HOSPITAL | Age: 69
Discharge: HOME | End: 2024-06-06
Payer: MEDICARE

## 2024-06-03 DIAGNOSIS — H81.4 VERTIGO OF CENTRAL ORIGIN: ICD-10-CM

## 2024-06-03 DIAGNOSIS — R07.89 OTHER CHEST PAIN: ICD-10-CM

## 2024-06-03 PROCEDURE — 71046 X-RAY EXAM CHEST 2 VIEWS: CPT

## 2024-06-03 PROCEDURE — A9579 GAD-BASE MR CONTRAST NOS,1ML: HCPCS | Performed by: RADIOLOGY

## 2024-06-03 PROCEDURE — 70553 MRI BRAIN STEM W/O & W/DYE: CPT

## 2024-06-03 PROCEDURE — 6360000004 HC RX CONTRAST MEDICATION: Performed by: RADIOLOGY

## 2024-06-03 RX ADMIN — GADOTERIDOL 15 ML: 279.3 INJECTION, SOLUTION INTRAVENOUS at 08:37
